# Patient Record
Sex: MALE | Race: WHITE | NOT HISPANIC OR LATINO | Employment: OTHER | URBAN - NONMETROPOLITAN AREA
[De-identification: names, ages, dates, MRNs, and addresses within clinical notes are randomized per-mention and may not be internally consistent; named-entity substitution may affect disease eponyms.]

---

## 2017-06-14 ENCOUNTER — OFFICE VISIT (OUTPATIENT)
Dept: GASTROENTEROLOGY | Facility: CLINIC | Age: 67
End: 2017-06-14

## 2017-06-14 VITALS
BODY MASS INDEX: 31.86 KG/M2 | HEIGHT: 67 IN | SYSTOLIC BLOOD PRESSURE: 138 MMHG | HEART RATE: 69 BPM | DIASTOLIC BLOOD PRESSURE: 80 MMHG | WEIGHT: 203 LBS

## 2017-06-14 DIAGNOSIS — K21.9 GASTROESOPHAGEAL REFLUX DISEASE WITHOUT ESOPHAGITIS: Primary | ICD-10-CM

## 2017-06-14 PROCEDURE — 99212 OFFICE O/P EST SF 10 MIN: CPT | Performed by: INTERNAL MEDICINE

## 2017-06-14 RX ORDER — FINASTERIDE 5 MG/1
TABLET, FILM COATED ORAL
Refills: 2 | COMMUNITY
Start: 2017-06-03 | End: 2018-09-17 | Stop reason: SDUPTHER

## 2017-06-14 RX ORDER — LISINOPRIL 10 MG/1
TABLET ORAL
Refills: 5 | COMMUNITY
Start: 2017-06-07

## 2017-06-14 RX ORDER — FLUOCINONIDE 0.5 MG/G
OINTMENT TOPICAL
Refills: 2 | COMMUNITY
Start: 2017-06-08

## 2017-06-14 RX ORDER — METRONIDAZOLE 7.5 MG/G
GEL TOPICAL
Refills: 2 | COMMUNITY
Start: 2017-06-08

## 2017-06-14 RX ORDER — OMEPRAZOLE 40 MG/1
40 CAPSULE, DELAYED RELEASE ORAL DAILY
Qty: 90 CAPSULE | Refills: 3 | Status: SHIPPED | OUTPATIENT
Start: 2017-06-14 | End: 2018-01-19 | Stop reason: SDUPTHER

## 2017-06-14 RX ORDER — OMEPRAZOLE 40 MG/1
CAPSULE, DELAYED RELEASE ORAL
Refills: 4 | COMMUNITY
Start: 2017-06-03 | End: 2017-06-14 | Stop reason: SDUPTHER

## 2017-06-18 NOTE — PROGRESS NOTES
Chief Complaint   Patient presents with   • 1 Year Clinical Appointment   • Gastroesophageal Reflux Disease Without Esophagitis        Enrique Selby is a  66 y.o. male here for a follow up visit forGERD     HPI:  The patient returns for follow-up.  Currently he is on Prilosec 40 mg daily which is controlling his reflux symptoms.  We discussed at some length potential side effects of PPIs.  Denies dysphagia.  Bowel movements regular with no recent change in bowel habits.  Denies rectal bleeding or melena.  His last colonoscopy was in 2011.    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, LAMONT,LLL      • Disorder of lipoprotein and lipid metabolism    • Epigastric pain    • Gastroesophageal reflux disease    • Gastroesophageal reflux disease    • Gastrointestinal symptom     Recent change in bowel habits      • Hematuria, unspecified    • Hiatal hernia    • Hip pain    • Hypertriglyceridemia    • IBS (irritable bowel syndrome)    • Malignant melanoma    • Multiple joint pain     Labs ordered, consult       • Nausea    • Right lower quadrant pain    • Right upper quadrant pain    • Screening for hyperlipidemia    • Screening for malignant neoplasm of prostate    • Trochanteric bursitis     Inj of Solumedrol with 1% Xylocaine given,tolerate   • UTI (urinary tract infection)        Current Outpatient Prescriptions   Medication Sig Dispense Refill   • finasteride (PROSCAR) 5 MG tablet   2   • fluocinonide (LIDEX) 0.05 % ointment   2   • lisinopril (PRINIVIL,ZESTRIL) 10 MG tablet   5   • metroNIDAZOLE (METROGEL) 0.75 % gel   2   • omeprazole (priLOSEC) 40 MG capsule Take 1 capsule by mouth Daily. 90 capsule 3     No current facility-administered medications for this visit.        PRN Meds:.    No Known Allergies    Social History     Social History   • Marital status:      Spouse name: N/A   • Number of children: N/A   • Years of  education: N/A     Occupational History   • Not on file.     Social History Main Topics   • Smoking status: Never Smoker   • Smokeless tobacco: Former User     Types: Snuff, Chew     Quit date: 2010   • Alcohol use Yes      Comment: Patient States He Comsumes Approximately 3 Alcoholic Beverages Per Month   • Drug use: No   • Sexual activity: Defer      Comment:      Other Topics Concern   • Not on file     Social History Narrative       Family History   Problem Relation Age of Onset   • Diabetes Other    • Hypertension Other        Review of Systems   Constitutional: Negative for activity change, chills, diaphoresis and unexpected weight change.   Cardiovascular: Negative for chest pain.   Gastrointestinal: Negative for abdominal distention, abdominal pain, anal bleeding, blood in stool, constipation, diarrhea, nausea, rectal pain and vomiting.   Musculoskeletal: Negative for arthralgias and myalgias.       Vitals:    06/14/17 1550   BP: 138/80   Pulse: 69       Physical Exam   Constitutional: He appears well-developed and well-nourished.   Cardiovascular: Normal rate, regular rhythm and normal heart sounds.  Exam reveals no gallop and no friction rub.    No murmur heard.  Pulmonary/Chest: Effort normal and breath sounds normal. No respiratory distress. He has no rales.   Abdominal: Soft. Normal appearance and bowel sounds are normal. He exhibits no distension and no ascites. There is no hepatosplenomegaly, splenomegaly or hepatomegaly. There is no tenderness. No hernia.   Nursing note and vitals reviewed.      ASSESSMENT AND PLAN      ICD-10-CM ICD-9-CM   1. Gastroesophageal reflux disease without esophagitis K21.9 530.81      Plan:  Continue current medical regimen  Standard antireflux measures  Return to clinic in 1 year        This document has been electronically signed by Rupesh Kimball MD on June 18, 2017 3:39 PM                                   “EMR Dragon/Transcription disclaimer:   Much of  this encounter note is an electronic transcription/translation of spoken language to printed text. The electronic translation of spoken language may permit erroneous, or at times, nonsensical words or phrases to be inadvertently transcribed; Although I have reviewed the note for such errors, some may still exist.”

## 2017-09-21 ENCOUNTER — LAB (OUTPATIENT)
Dept: LAB | Facility: HOSPITAL | Age: 67
End: 2017-09-21

## 2017-09-21 ENCOUNTER — TRANSCRIBE ORDERS (OUTPATIENT)
Dept: LAB | Facility: HOSPITAL | Age: 67
End: 2017-09-21

## 2017-09-21 DIAGNOSIS — Z79.899 ENCOUNTER FOR LONG-TERM (CURRENT) USE OF MEDICATIONS: ICD-10-CM

## 2017-09-21 DIAGNOSIS — Z79.899 ENCOUNTER FOR LONG-TERM (CURRENT) USE OF MEDICATIONS: Primary | ICD-10-CM

## 2017-09-21 LAB
ALBUMIN SERPL-MCNC: 4.2 G/DL (ref 3.4–4.8)
ALBUMIN/GLOB SERPL: 1.4 G/DL (ref 1.1–1.8)
ALP SERPL-CCNC: 59 U/L (ref 38–126)
ALT SERPL W P-5'-P-CCNC: 56 U/L (ref 21–72)
ANION GAP SERPL CALCULATED.3IONS-SCNC: 12 MMOL/L (ref 5–15)
AST SERPL-CCNC: 33 U/L (ref 17–59)
BASOPHILS # BLD AUTO: 0.03 10*3/MM3 (ref 0–0.2)
BASOPHILS NFR BLD AUTO: 0.4 % (ref 0–2)
BILIRUB SERPL-MCNC: 0.7 MG/DL (ref 0.2–1.3)
BUN BLD-MCNC: 14 MG/DL (ref 7–21)
BUN/CREAT SERPL: 14.7 (ref 7–25)
CALCIUM SPEC-SCNC: 9.1 MG/DL (ref 8.4–10.2)
CHLORIDE SERPL-SCNC: 100 MMOL/L (ref 95–110)
CO2 SERPL-SCNC: 26 MMOL/L (ref 22–31)
CREAT BLD-MCNC: 0.95 MG/DL (ref 0.7–1.3)
DEPRECATED RDW RBC AUTO: 41.1 FL (ref 35.1–43.9)
EOSINOPHIL # BLD AUTO: 0.17 10*3/MM3 (ref 0–0.7)
EOSINOPHIL NFR BLD AUTO: 2 % (ref 0–7)
ERYTHROCYTE [DISTWIDTH] IN BLOOD BY AUTOMATED COUNT: 13.1 % (ref 11.5–14.5)
GFR SERPL CREATININE-BSD FRML MDRD: 80 ML/MIN/1.73 (ref 49–113)
GLOBULIN UR ELPH-MCNC: 2.9 GM/DL (ref 2.3–3.5)
GLUCOSE BLD-MCNC: 121 MG/DL (ref 60–100)
HCT VFR BLD AUTO: 44 % (ref 39–49)
HGB BLD-MCNC: 14.7 G/DL (ref 13.7–17.3)
IMM GRANULOCYTES # BLD: 0.03 10*3/MM3 (ref 0–0.02)
IMM GRANULOCYTES NFR BLD: 0.4 % (ref 0–0.5)
LYMPHOCYTES # BLD AUTO: 2.43 10*3/MM3 (ref 0.6–4.2)
LYMPHOCYTES NFR BLD AUTO: 28.7 % (ref 10–50)
MCH RBC QN AUTO: 28.6 PG (ref 26.5–34)
MCHC RBC AUTO-ENTMCNC: 33.4 G/DL (ref 31.5–36.3)
MCV RBC AUTO: 85.6 FL (ref 80–98)
MONOCYTES # BLD AUTO: 1.15 10*3/MM3 (ref 0–0.9)
MONOCYTES NFR BLD AUTO: 13.6 % (ref 0–12)
NEUTROPHILS # BLD AUTO: 4.67 10*3/MM3 (ref 2–8.6)
NEUTROPHILS NFR BLD AUTO: 54.9 % (ref 37–80)
PLATELET # BLD AUTO: 191 10*3/MM3 (ref 150–450)
PMV BLD AUTO: 11.7 FL (ref 8–12)
POTASSIUM BLD-SCNC: 4.1 MMOL/L (ref 3.5–5.1)
PROT SERPL-MCNC: 7.1 G/DL (ref 6.3–8.6)
RBC # BLD AUTO: 5.14 10*6/MM3 (ref 4.37–5.74)
SODIUM BLD-SCNC: 138 MMOL/L (ref 137–145)
TSH SERPL DL<=0.05 MIU/L-ACNC: 3.12 MIU/ML (ref 0.46–4.68)
WBC NRBC COR # BLD: 8.48 10*3/MM3 (ref 3.2–9.8)

## 2017-09-21 PROCEDURE — 85025 COMPLETE CBC W/AUTO DIFF WBC: CPT | Performed by: DERMATOLOGY

## 2017-09-21 PROCEDURE — 80053 COMPREHEN METABOLIC PANEL: CPT | Performed by: DERMATOLOGY

## 2017-09-21 PROCEDURE — 36415 COLL VENOUS BLD VENIPUNCTURE: CPT

## 2017-09-21 PROCEDURE — 84443 ASSAY THYROID STIM HORMONE: CPT | Performed by: DERMATOLOGY

## 2018-01-19 DIAGNOSIS — K21.9 GASTROESOPHAGEAL REFLUX DISEASE WITHOUT ESOPHAGITIS: ICD-10-CM

## 2018-01-19 RX ORDER — OMEPRAZOLE 40 MG/1
40 CAPSULE, DELAYED RELEASE ORAL DAILY
Qty: 90 CAPSULE | Refills: 1 | Status: SHIPPED | OUTPATIENT
Start: 2018-01-19 | End: 2018-04-20 | Stop reason: SDUPTHER

## 2018-04-20 DIAGNOSIS — K21.9 GASTROESOPHAGEAL REFLUX DISEASE WITHOUT ESOPHAGITIS: ICD-10-CM

## 2018-04-26 RX ORDER — OMEPRAZOLE 40 MG/1
40 CAPSULE, DELAYED RELEASE ORAL DAILY
Qty: 90 CAPSULE | Refills: 1 | Status: SHIPPED | OUTPATIENT
Start: 2018-04-26 | End: 2018-05-23 | Stop reason: SDUPTHER

## 2018-05-23 ENCOUNTER — OFFICE VISIT (OUTPATIENT)
Dept: GASTROENTEROLOGY | Facility: CLINIC | Age: 68
End: 2018-05-23

## 2018-05-23 VITALS
DIASTOLIC BLOOD PRESSURE: 74 MMHG | HEIGHT: 67 IN | SYSTOLIC BLOOD PRESSURE: 150 MMHG | BODY MASS INDEX: 32.8 KG/M2 | WEIGHT: 209 LBS

## 2018-05-23 DIAGNOSIS — K21.9 GASTROESOPHAGEAL REFLUX DISEASE WITHOUT ESOPHAGITIS: Primary | ICD-10-CM

## 2018-05-23 PROCEDURE — 99213 OFFICE O/P EST LOW 20 MIN: CPT | Performed by: NURSE PRACTITIONER

## 2018-05-23 RX ORDER — OMEPRAZOLE 40 MG/1
40 CAPSULE, DELAYED RELEASE ORAL DAILY
Qty: 90 CAPSULE | Refills: 3 | Status: SHIPPED | OUTPATIENT
Start: 2018-05-23 | End: 2019-05-22 | Stop reason: SDUPTHER

## 2018-05-23 NOTE — PROGRESS NOTES
Chief Complaint   Patient presents with   • Heartburn       Subjective    Enriqueramsey Selby is a 67 y.o. male. he is here today for follow-up.  67-year-old male presents for 1 year follow-up regarding chronic GERD.  Denies any abdominal pain.  He was diagnosed by Dr. Kimball several years ago and has been well maintained on Prilosec daily.  He denies any breakthrough symptoms.  Denies any nausea vomiting or changes in his bowel habits.  Colonoscopy was last completed in 2011 and repeat is due in 2021.  EGD was last completed to/3/11 which noted normal esophagus, 2 cm sliding hiatal hernia, nonerosive gastritis normal duodenum.    Heartburn   He complains of heartburn. He reports no abdominal pain, no belching, no chest pain, no choking, no coughing, no dysphagia, no early satiety, no globus sensation, no hoarse voice, no nausea, no sore throat, no stridor, no tooth decay, no water brash or no wheezing. This is a chronic problem. The current episode started more than 1 year ago. The problem occurs rarely. The problem has been resolved. Exacerbated by: symptoms are well controlled by medication  Pertinent negatives include no fatigue. He has tried a PPI for the symptoms. The treatment provided significant relief.     Plan; follow-up in 1 year refills of Prilosec are sent to pharmacy return to GI office sooner if needed.  Continue standard antireflux measures.  Repeat colonoscopy in 2021 for surveillance.     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, LAMONT,LLL      • Disorder of lipoprotein and lipid metabolism    • Epigastric pain    • Gastroesophageal reflux disease    • Gastroesophageal reflux disease    • Gastrointestinal symptom     Recent change in bowel habits      • Hematuria, unspecified    • Hiatal hernia    • Hip pain    •  Hypertriglyceridemia    • IBS (irritable bowel syndrome)    • Malignant melanoma    • Multiple joint pain     Labs ordered, consult       • Nausea    • Right lower quadrant pain    • Right upper quadrant pain    • Screening for hyperlipidemia    • Screening for malignant neoplasm of prostate    • Trochanteric bursitis     Inj of Solumedrol with 1% Xylocaine given,tolerate   • UTI (urinary tract infection)      Past Surgical History:   Procedure Laterality Date   • COLONOSCOPY  02/01/2011   • ENDOSCOPY      w/biopsy. Normal esophagus, symmetrical & patent pylorus, & duodenum. Hiatus hernia was found in GE junction. Non-erosive gastritis found in antrum. Biopsy taken.   • PRE-MALIGNANT / BENIGN SKIN LESION EXCISION     • SKIN BIOPSY     • UPPER GASTROINTESTINAL ENDOSCOPY  02/03/2011     Family History   Problem Relation Age of Onset   • Diabetes Other    • Hypertension Other        Current Outpatient Prescriptions   Medication Sig Dispense Refill   • finasteride (PROSCAR) 5 MG tablet   2   • fluocinonide (LIDEX) 0.05 % ointment   2   • lisinopril (PRINIVIL,ZESTRIL) 10 MG tablet   5   • metroNIDAZOLE (METROGEL) 0.75 % gel   2   • omeprazole (priLOSEC) 40 MG capsule Take 1 capsule by mouth Daily. 90 capsule 3     No current facility-administered medications for this visit.      No Known Allergies  Social History     Social History   • Marital status:      Social History Main Topics   • Smoking status: Never Smoker   • Smokeless tobacco: Former User     Types: Snuff, Chew     Quit date: 2010   • Alcohol use Yes      Comment: Patient States He Comsumes Approximately 3 Alcoholic Beverages Per Month   • Drug use: No   • Sexual activity: Defer      Comment:      Other Topics Concern   • Not on file       Review of Systems  Review of Systems   Constitutional: Negative for activity change, appetite change, chills, diaphoresis, fatigue, fever and unexpected weight change.   HENT: Negative for hoarse  "voice, sore throat and trouble swallowing.    Respiratory: Negative for cough, choking, shortness of breath and wheezing.    Cardiovascular: Negative for chest pain.   Gastrointestinal: Positive for heartburn. Negative for abdominal distention, abdominal pain, anal bleeding, blood in stool, constipation, diarrhea, dysphagia, nausea, rectal pain and vomiting.   Musculoskeletal: Negative for arthralgias.   Skin: Negative for pallor.   Neurological: Negative for light-headedness.        /74   Ht 170.2 cm (67\")   Wt 94.8 kg (209 lb)   BMI 32.73 kg/m²     Objective    Physical Exam   Constitutional: He is oriented to person, place, and time. He appears well-developed and well-nourished. He is cooperative. No distress.   HENT:   Head: Normocephalic and atraumatic.   Neck: Normal range of motion. Neck supple. No thyromegaly present.   Cardiovascular: Normal rate, regular rhythm and normal heart sounds.    Pulmonary/Chest: Effort normal and breath sounds normal. He has no wheezes. He has no rhonchi. He has no rales.   Abdominal: Soft. Normal appearance and bowel sounds are normal. He exhibits no distension. There is no hepatosplenomegaly. There is no tenderness. There is no rigidity and no guarding. No hernia.   Lymphadenopathy:     He has no cervical adenopathy.   Neurological: He is alert and oriented to person, place, and time.   Skin: Skin is warm, dry and intact. No rash noted. No pallor.   Psychiatric: He has a normal mood and affect. His speech is normal.     Lab on 09/21/2017   Component Date Value Ref Range Status   • Glucose 09/21/2017 121* 60 - 100 mg/dL Final   • BUN 09/21/2017 14  7 - 21 mg/dL Final   • Creatinine 09/21/2017 0.95  0.70 - 1.30 mg/dL Final   • Sodium 09/21/2017 138  137 - 145 mmol/L Final   • Potassium 09/21/2017 4.1  3.5 - 5.1 mmol/L Final   • Chloride 09/21/2017 100  95 - 110 mmol/L Final   • CO2 09/21/2017 26.0  22.0 - 31.0 mmol/L Final   • Calcium 09/21/2017 9.1  8.4 - 10.2 mg/dL " Final   • Total Protein 09/21/2017 7.1  6.3 - 8.6 g/dL Final   • Albumin 09/21/2017 4.20  3.40 - 4.80 g/dL Final   • ALT (SGPT) 09/21/2017 56  21 - 72 U/L Final   • AST (SGOT) 09/21/2017 33  17 - 59 U/L Final   • Alkaline Phosphatase 09/21/2017 59  38 - 126 U/L Final   • Total Bilirubin 09/21/2017 0.7  0.2 - 1.3 mg/dL Final   • eGFR Non African Amer 09/21/2017 80  49 - 113 mL/min/1.73 Final   • Globulin 09/21/2017 2.9  2.3 - 3.5 gm/dL Final   • A/G Ratio 09/21/2017 1.4  1.1 - 1.8 g/dL Final   • BUN/Creatinine Ratio 09/21/2017 14.7  7.0 - 25.0 Final   • Anion Gap 09/21/2017 12.0  5.0 - 15.0 mmol/L Final   • TSH 09/21/2017 3.120  0.460 - 4.680 mIU/mL Final   • WBC 09/21/2017 8.48  3.20 - 9.80 10*3/mm3 Final   • RBC 09/21/2017 5.14  4.37 - 5.74 10*6/mm3 Final   • Hemoglobin 09/21/2017 14.7  13.7 - 17.3 g/dL Final   • Hematocrit 09/21/2017 44.0  39.0 - 49.0 % Final   • MCV 09/21/2017 85.6  80.0 - 98.0 fL Final   • MCH 09/21/2017 28.6  26.5 - 34.0 pg Final   • MCHC 09/21/2017 33.4  31.5 - 36.3 g/dL Final   • RDW 09/21/2017 13.1  11.5 - 14.5 % Final   • RDW-SD 09/21/2017 41.1  35.1 - 43.9 fl Final   • MPV 09/21/2017 11.7  8.0 - 12.0 fL Final   • Platelets 09/21/2017 191  150 - 450 10*3/mm3 Final   • Neutrophil % 09/21/2017 54.9  37.0 - 80.0 % Final   • Lymphocyte % 09/21/2017 28.7  10.0 - 50.0 % Final   • Monocyte % 09/21/2017 13.6* 0.0 - 12.0 % Final   • Eosinophil % 09/21/2017 2.0  0.0 - 7.0 % Final   • Basophil % 09/21/2017 0.4  0.0 - 2.0 % Final   • Immature Grans % 09/21/2017 0.4  0.0 - 0.5 % Final   • Neutrophils, Absolute 09/21/2017 4.67  2.00 - 8.60 10*3/mm3 Final   • Lymphocytes, Absolute 09/21/2017 2.43  0.60 - 4.20 10*3/mm3 Final   • Monocytes, Absolute 09/21/2017 1.15* 0.00 - 0.90 10*3/mm3 Final   • Eosinophils, Absolute 09/21/2017 0.17  0.00 - 0.70 10*3/mm3 Final   • Basophils, Absolute 09/21/2017 0.03  0.00 - 0.20 10*3/mm3 Final   • Immature Grans, Absolute 09/21/2017 0.03* 0.00 - 0.02 10*3/mm3 Final      Assessment/Plan      1. Gastroesophageal reflux disease without esophagitis    .       Orders placed during this encounter include:  No orders of the defined types were placed in this encounter.      * Surgery not found *    Review and/or summary of lab tests, radiology, procedures, medications. Review and summary of old records and obtaining of history. The risks and benefits of my recommendations, as well as other treatment options were discussed with the patient today. Questions were answered.    New Medications Ordered This Visit   Medications   • omeprazole (priLOSEC) 40 MG capsule     Sig: Take 1 capsule by mouth Daily.     Dispense:  90 capsule     Refill:  3       Follow-up: Return in about 1 year (around 5/23/2019).          This document has been electronically signed by GIOVANNA Johnston on May 23, 2018 4:06 PM             Results for orders placed or performed in visit on 09/21/17   CBC Auto Differential   Result Value Ref Range    WBC 8.48 3.20 - 9.80 10*3/mm3    RBC 5.14 4.37 - 5.74 10*6/mm3    Hemoglobin 14.7 13.7 - 17.3 g/dL    Hematocrit 44.0 39.0 - 49.0 %    MCV 85.6 80.0 - 98.0 fL    MCH 28.6 26.5 - 34.0 pg    MCHC 33.4 31.5 - 36.3 g/dL    RDW 13.1 11.5 - 14.5 %    RDW-SD 41.1 35.1 - 43.9 fl    MPV 11.7 8.0 - 12.0 fL    Platelets 191 150 - 450 10*3/mm3    Neutrophil % 54.9 37.0 - 80.0 %    Lymphocyte % 28.7 10.0 - 50.0 %    Monocyte % 13.6 (H) 0.0 - 12.0 %    Eosinophil % 2.0 0.0 - 7.0 %    Basophil % 0.4 0.0 - 2.0 %    Immature Grans % 0.4 0.0 - 0.5 %    Neutrophils, Absolute 4.67 2.00 - 8.60 10*3/mm3    Lymphocytes, Absolute 2.43 0.60 - 4.20 10*3/mm3    Monocytes, Absolute 1.15 (H) 0.00 - 0.90 10*3/mm3    Eosinophils, Absolute 0.17 0.00 - 0.70 10*3/mm3    Basophils, Absolute 0.03 0.00 - 0.20 10*3/mm3    Immature Grans, Absolute 0.03 (H) 0.00 - 0.02 10*3/mm3   TSH   Result Value Ref Range    TSH 3.120 0.460 - 4.680 mIU/mL   Comprehensive Metabolic Panel   Result Value Ref Range    Glucose  121 (H) 60 - 100 mg/dL    BUN 14 7 - 21 mg/dL    Creatinine 0.95 0.70 - 1.30 mg/dL    Sodium 138 137 - 145 mmol/L    Potassium 4.1 3.5 - 5.1 mmol/L    Chloride 100 95 - 110 mmol/L    CO2 26.0 22.0 - 31.0 mmol/L    Calcium 9.1 8.4 - 10.2 mg/dL    Total Protein 7.1 6.3 - 8.6 g/dL    Albumin 4.20 3.40 - 4.80 g/dL    ALT (SGPT) 56 21 - 72 U/L    AST (SGOT) 33 17 - 59 U/L    Alkaline Phosphatase 59 38 - 126 U/L    Total Bilirubin 0.7 0.2 - 1.3 mg/dL    eGFR Non  Amer 80 49 - 113 mL/min/1.73    Globulin 2.9 2.3 - 3.5 gm/dL    A/G Ratio 1.4 1.1 - 1.8 g/dL    BUN/Creatinine Ratio 14.7 7.0 - 25.0    Anion Gap 12.0 5.0 - 15.0 mmol/L   Results for orders placed or performed during the hospital encounter of 06/09/16   CBC and Differential   Result Value Ref Range    WBC 6.5 3.2 - 9.8 x1000/uL    RBC 4.87 4.37 - 5.74 gaurav/mm3    Hemoglobin 14.3 13.7 - 17.3 gm/dl    Hematocrit 41.5 39.0 - 49.0 %    MCV 85.2 80.0 - 98.0 fl    MCH 29.4 26.0 - 34.0 pg    MCHC 34.5 31.5 - 36.3 gm/dl    RDW 13.6 11.5 - 14.5 %    Platelets 191 150 - 450 x1000/mm3    MPV 11.7 8.0 - 12.0 fl    Neutrophil Rel % 51.1 37.0 - 80.0 %    Lymphocyte Rel % 31.7 10.0 - 50.0 %    Monocyte Rel % 14.2 (H) 0.0 - 12.0 %    Eosinophil Rel % 2.0 0.0 - 7.0 %    Basophil Rel % 0.5 0.0 - 2.0 %    Immature Granulocyte Rel % 0.50 0.00 - 0.50 %    Neutrophils Absolute 3.33 2.00 - 8.60 x1000/uL    Lymphocytes Absolute 2.06 0.60 - 4.20 x1000/uL    Monocytes Absolute 0.92 (H) 0.00 - 0.90 x1000/uL    Eosinophils Absolute 0.13 0.00 - 0.70 x1000/uL    Basophils Absolute 0.03 0.00 - 0.20 x1000/uL    Immature Granulocytes Absolute 0.030 (H) 0.005 - 0.022 x1000/uL   Comprehensive metabolic panel   Result Value Ref Range    Sodium 139 137 - 145 mmol/L    Potassium 4.2 3.5 - 5.1 mmol/L    Chloride 101 95 - 110 mmol/L    CO2 25 22 - 31 mmol/L    Anion Gap 13.0 5.0 - 15.0 mmol/L    Glucose 102 (H) 60 - 100 mg/dl    BUN 12 7 - 21 mg/dl    Creatinine 0.8 0.7 - 1.3 mg/dl    GFR MDRD  Non  98 49 - 113 mL/min/1.73 sq.M    GFR MDRD  118 (H) 49 - 113 mL/min/1.73 sq.M    Calcium 9.2 8.4 - 10.2 mg/dl    Total Protein 6.8 6.3 - 8.6 gm/dl    Albumin 4.1 3.4 - 4.8 gm/dl    Total Bilirubin 0.8 0.2 - 1.3 mg/dl    Alkaline Phosphatase 54 38 - 126 U/L    AST (SGOT) 34 17 - 59 U/L    ALT (SGPT) 42 21 - 72 U/L   Results for orders placed or performed during the hospital encounter of 12/22/15   Nuclear antigen antibody, IFA   Result Value Ref Range    WINNIE Negative    CBC and Differential   Result Value Ref Range    WBC 8.1 3.2 - 9.8 x1000/uL    RBC 5.15 4.37 - 5.74 gaurav/mm3    Hemoglobin 14.5 13.7 - 17.3 gm/dl    Hematocrit 42.5 39.0 - 49.0 %    MCV 82.5 80.0 - 98.0 fl    MCH 28.2 26.0 - 34.0 pg    MCHC 34.1 31.5 - 36.3 gm/dl    RDW 13.2 11.5 - 14.5 %    Platelets 213 150 - 450 x1000/mm3    MPV 10.7 8.0 - 12.0 fl    Neutrophil Rel % 55.3 37.0 - 80.0 %    Lymphocyte Rel % 28.4 10.0 - 50.0 %    Monocyte Rel % 14.0 (H) 0.0 - 12.0 %    Eosinophil Rel % 1.7 0.0 - 7.0 %    Basophil Rel % 0.2 0.0 - 2.0 %    Immature Granulocyte Rel % 0.40 0.00 - 0.50 %    Neutrophils Absolute 4.46 2.00 - 8.60 x1000/uL    Lymphocytes Absolute 2.29 0.60 - 4.20 x1000/uL    Monocytes Absolute 1.13 (H) 0.00 - 0.90 x1000/uL    Eosinophils Absolute 0.14 0.00 - 0.70 x1000/uL    Basophils Absolute 0.02 0.00 - 0.20 x1000/uL    Immature Granulocytes Absolute 0.030 (H) 0.005 - 0.022 x1000/uL     *Note: Due to a large number of results and/or encounters for the requested time period, some results have not been displayed. A complete set of results can be found in Results Review.

## 2018-05-23 NOTE — PATIENT INSTRUCTIONS
Food Choices for Gastroesophageal Reflux Disease, Adult  When you have gastroesophageal reflux disease (GERD), the foods you eat and your eating habits are very important. Choosing the right foods can help ease your discomfort.  What guidelines do I need to follow?  · Choose fruits, vegetables, whole grains, and low-fat dairy products.  · Choose low-fat meat, fish, and poultry.  · Limit fats such as oils, salad dressings, butter, nuts, and avocado.  · Keep a food diary. This helps you identify foods that cause symptoms.  · Avoid foods that cause symptoms. These may be different for everyone.  · Eat small meals often instead of 3 large meals a day.  · Eat your meals slowly, in a place where you are relaxed.  · Limit fried foods.  · Cook foods using methods other than frying.  · Avoid drinking alcohol.  · Avoid drinking large amounts of liquids with your meals.  · Avoid bending over or lying down until 2-3 hours after eating.  What foods are not recommended?  These are some foods and drinks that may make your symptoms worse:  Vegetables   Tomatoes. Tomato juice. Tomato and spaghetti sauce. Chili peppers. Onion and garlic. Horseradish.  Fruits   Oranges, grapefruit, and lemon (fruit and juice).  Meats   High-fat meats, fish, and poultry. This includes hot dogs, ribs, ham, sausage, salami, and anthony.  Dairy   Whole milk and chocolate milk. Sour cream. Cream. Butter. Ice cream. Cream cheese.  Drinks   Coffee and tea. Bubbly (carbonated) drinks or energy drinks.  Condiments   Hot sauce. Barbecue sauce.  Sweets/Desserts   Chocolate and cocoa. Donuts. Peppermint and spearmint.  Fats and Oils   High-fat foods. This includes French fries and potato chips.  Other   Vinegar. Strong spices. This includes black pepper, white pepper, red pepper, cayenne, vargas powder, cloves, ginger, and chili powder.  The items listed above may not be a complete list of foods and drinks to avoid. Contact your dietitian for more information.    This information is not intended to replace advice given to you by your health care provider. Make sure you discuss any questions you have with your health care provider.  Document Released: 06/18/2013 Document Revised: 05/25/2017 Document Reviewed: 10/22/2014  [employer]vier Interactive Patient Education © 2017 [employer]

## 2018-09-17 ENCOUNTER — CONSULT (OUTPATIENT)
Dept: SURGERY | Facility: CLINIC | Age: 68
End: 2018-09-17

## 2018-09-17 VITALS
HEART RATE: 86 BPM | WEIGHT: 205 LBS | TEMPERATURE: 98 F | DIASTOLIC BLOOD PRESSURE: 66 MMHG | BODY MASS INDEX: 32.18 KG/M2 | HEIGHT: 67 IN | SYSTOLIC BLOOD PRESSURE: 130 MMHG

## 2018-09-17 DIAGNOSIS — C44.90 SKIN CANCER: Primary | ICD-10-CM

## 2018-09-17 PROBLEM — R29.898 WEAKNESS OF LEFT LOWER EXTREMITY: Status: ACTIVE | Noted: 2018-07-19

## 2018-09-17 PROBLEM — M25.552 BILATERAL HIP PAIN: Status: ACTIVE | Noted: 2018-07-19

## 2018-09-17 PROBLEM — Z85.820 HISTORY OF MELANOMA: Status: ACTIVE | Noted: 2018-07-19

## 2018-09-17 PROBLEM — M25.551 BILATERAL HIP PAIN: Status: ACTIVE | Noted: 2018-07-19

## 2018-09-17 PROCEDURE — 99203 OFFICE O/P NEW LOW 30 MIN: CPT | Performed by: SURGERY

## 2018-09-17 RX ORDER — HYDROCODONE BITARTRATE AND ACETAMINOPHEN 7.5; 325 MG/1; MG/1
TABLET ORAL
Refills: 0 | COMMUNITY
Start: 2018-08-06

## 2018-09-17 RX ORDER — FINASTERIDE 5 MG/1
1.25 TABLET, FILM COATED ORAL
COMMUNITY

## 2018-09-17 NOTE — PATIENT INSTRUCTIONS

## 2018-09-17 NOTE — PROGRESS NOTES
Subjective   Enrique Selby is a 68 y.o. male     History of Present Illness referred by Dr. 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  This was done a few weeks ago.  Patient does not have sleep clear where the biopsy was done.  He's had skin cancers in the past.  He is also set up to have orthopedic surgery on his hip next week and instructed to get this done before the orthopedic surgery.        Review of Systems   Constitutional: Negative.    HENT: Positive for hearing loss.    Eyes: Negative.    Respiratory: Negative.    Cardiovascular: Negative.    Gastrointestinal: Negative.    Endocrine: Negative.    Genitourinary: Negative.    Musculoskeletal: Positive for arthralgias.        Bilateral Leg Pain   Skin:        Squamous Cell Upper Chest Wall   Allergic/Immunologic: Negative.    Neurological: Negative.    Hematological: Negative.    Psychiatric/Behavioral: Negative.      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, LAMONT,LLL      • Disorder of lipoprotein and lipid metabolism    • Epigastric pain    • Gastroesophageal reflux disease    • Gastroesophageal reflux disease    • Gastrointestinal symptom     Recent change in bowel habits      • Hematuria, unspecified    • Hiatal hernia    • Hip pain    • Hypertriglyceridemia    • IBS (irritable bowel syndrome)    • Malignant melanoma (CMS/HCC)    • Multiple joint pain     Labs ordered, consult       • Nausea    • Right lower quadrant pain    • Right upper quadrant pain    • Screening for hyperlipidemia    • Screening for malignant neoplasm of prostate    • Trochanteric bursitis     Inj of Solumedrol with 1% Xylocaine given,tolerate   • UTI (urinary tract infection)      Past Surgical History:   Procedure Laterality Date   • COLONOSCOPY  02/01/2011   • ENDOSCOPY      w/biopsy. Normal esophagus,  symmetrical & patent pylorus, & duodenum. Hiatus hernia was found in GE junction. Non-erosive gastritis found in antrum. Biopsy taken.   • PRE-MALIGNANT / BENIGN SKIN LESION EXCISION     • SKIN BIOPSY     • UPPER GASTROINTESTINAL ENDOSCOPY  02/03/2011     Family History   Problem Relation Age of Onset   • Diabetes Other    • Hypertension Other      Social History     Social History   • Marital status:      Spouse name: N/A   • Number of children: N/A   • Years of education: N/A     Occupational History   • Not on file.     Social History Main Topics   • Smoking status: Never Smoker   • Smokeless tobacco: Former User     Types: Snuff, Chew     Quit date: 2010   • Alcohol use Yes      Comment: Patient States He Comsumes Approximately 3 Alcoholic Beverages Per Month   • Drug use: No   • Sexual activity: Defer      Comment:      Other Topics Concern   • Not on file     Social History Narrative   • No narrative on file     No Known Allergies    Home Medications:  Prior to Admission medications    Medication Sig Start Date End Date Taking? Authorizing Provider   fluocinonide (LIDEX) 0.05 % ointment  6/8/17  Yes Les Grimes MD   lisinopril (PRINIVIL,ZESTRIL) 10 MG tablet  6/7/17  Yes Les Grimes MD   omeprazole (priLOSEC) 40 MG capsule Take 1 capsule by mouth Daily. 5/23/18  Yes Nicolasa Hoyos APRN   finasteride (PROSCAR) 5 MG tablet  6/3/17 9/17/18 Yes Les Grimes MD   finasteride (PROSCAR) 5 MG tablet Take 1.25 mg by mouth.    Les Grimes MD   HYDROcodone-acetaminophen (NORCO) 7.5-325 MG per tablet  8/6/18   Les Grimes MD   metroNIDAZOLE (METROGEL) 0.75 % gel  6/8/17   Les Grimes MD       Objective   Physical Exam   Constitutional: He is oriented to person, place, and time. He appears well-developed and well-nourished. No distress.   HENT:   Head: Normocephalic and atraumatic.   Nose: Nose normal.   Eyes: Conjunctivae are normal.   Neck: Normal  range of motion. No tracheal deviation present. No thyromegaly present.   Cardiovascular: Normal rate, regular rhythm and normal heart sounds.    No murmur heard.  Pulmonary/Chest: Effort normal and breath sounds normal. No respiratory distress. He has no wheezes. He has no rales. He exhibits no tenderness.   Abdominal: Soft. He exhibits no distension. There is no tenderness. There is no rebound and no guarding. No hernia.   Musculoskeletal: He exhibits no tenderness or deformity.   Neurological: He is alert and oriented to person, place, and time.   Skin: Skin is warm and dry. No rash noted.   Psychiatric: He has a normal mood and affect. His behavior is normal. Judgment and thought content normal.   Vitals reviewed.   multiple scars consistent with biopsy on his upper anterior chest wall and base of his neck.  No obvious adenopathy appreciated in his neck or his axilla.  No ulcerations or raised lesions.      Assessment/Plan squamous cell skin cancer upper anterior chest wall.  Unable to identify the shave biopsy site so patient will have to go by dermatology the day before we excise this have been markedly clearly and then he should maintain is markedly sees us.  He is undergoing have his orthopedic surgery next week and then we can see him in approximately 1-2 weeks after his orthopedic surgery when he is recovering to address this.  He understands he needs to go by dermatology the day before the morning before he sees us to have this marked      There were no encounter diagnoses.                     This document has been electronically signed by Adelia Bailey CSA on September 17, 2018 2:15 PM

## 2018-10-16 ENCOUNTER — PROCEDURE VISIT (OUTPATIENT)
Dept: SURGERY | Facility: CLINIC | Age: 68
End: 2018-10-16

## 2018-10-16 VITALS
SYSTOLIC BLOOD PRESSURE: 136 MMHG | TEMPERATURE: 98.2 F | WEIGHT: 202 LBS | HEART RATE: 80 BPM | BODY MASS INDEX: 31.71 KG/M2 | HEIGHT: 67 IN | DIASTOLIC BLOOD PRESSURE: 70 MMHG

## 2018-10-16 DIAGNOSIS — C44.90 SKIN CANCER: Primary | ICD-10-CM

## 2018-10-16 DIAGNOSIS — C76.1 PRIMARY SQUAMOUS CELL CARCINOMA OF CHEST WALL (HCC): ICD-10-CM

## 2018-10-16 PROCEDURE — 11604 EXC TR-EXT MAL+MARG 3.1-4 CM: CPT | Performed by: SURGERY

## 2018-10-16 PROCEDURE — 88305 TISSUE EXAM BY PATHOLOGIST: CPT | Performed by: SURGERY

## 2018-10-16 PROCEDURE — 88305 TISSUE EXAM BY PATHOLOGIST: CPT | Performed by: PATHOLOGY

## 2018-10-16 RX ORDER — ASPIRIN 81 MG/1
81 TABLET, CHEWABLE ORAL
COMMUNITY
Start: 2018-09-25 | End: 2019-05-22

## 2018-10-16 RX ORDER — GABAPENTIN 400 MG/1
400 CAPSULE ORAL
COMMUNITY
Start: 2018-09-25

## 2018-10-16 NOTE — PATIENT INSTRUCTIONS

## 2018-10-16 NOTE — PROGRESS NOTES
See prior note.  This patient presents today to have a shave biopsy site of a squamous cell cancer on his anterior chest wall reexcised here in the office under local.  Patient had his hip surgery with orthopedics and had a good result.  He went by dermatology and had the site specifically marked and is currently marked here today.  Again I went over with the patient however do this under local he clearly understands and agrees and wishes to proceed we prepped the area with Betadine infiltrated local and good block was obtained made a skin incision sharply with scalpel full-thickness of the skin excised an ellipse of skin oriented in transverse direction.  Full-thickness incision was made with the scalpel and then we came across subcutaneous with the scalpel.  Specimen was handed off and we clearly labeled recent pathology.  One small vein bleeding was controlled with a figure-of-eight 4-0 Monocryl stitch.  Skin was then closed interrupted 4-0 Monocryl subject her stitches and a running 4-0 Monocryl subject her stitch.  Steri-Strips were applied patient was instructed in local wound care and will follow with us in 2 weeks or sooner if he has any other concerns or questions.  The incision was 3.5 cm in length at its completion

## 2018-10-18 LAB
LAB AP CASE REPORT: NORMAL
PATH REPORT.FINAL DX SPEC: NORMAL
PATH REPORT.GROSS SPEC: NORMAL

## 2018-10-30 ENCOUNTER — OFFICE VISIT (OUTPATIENT)
Dept: SURGERY | Facility: CLINIC | Age: 68
End: 2018-10-30

## 2018-10-30 VITALS
HEART RATE: 82 BPM | SYSTOLIC BLOOD PRESSURE: 120 MMHG | HEIGHT: 67 IN | WEIGHT: 201 LBS | BODY MASS INDEX: 31.55 KG/M2 | TEMPERATURE: 97.9 F | DIASTOLIC BLOOD PRESSURE: 62 MMHG

## 2018-10-30 DIAGNOSIS — C44.90 SKIN CANCER: Primary | ICD-10-CM

## 2018-10-30 PROCEDURE — 99212 OFFICE O/P EST SF 10 MIN: CPT | Performed by: SURGERY

## 2018-10-30 RX ORDER — ONDANSETRON 8 MG/1
TABLET, ORALLY DISINTEGRATING ORAL
COMMUNITY
Start: 2018-10-18 | End: 2019-05-22

## 2018-10-30 NOTE — PATIENT INSTRUCTIONS

## 2018-10-30 NOTE — PROGRESS NOTES
68-year-old male now 2 weeks out from reexcision of the shave biopsy site of squamous cell cancer from his anterior chest.  Pathology was no residual tumor present.  I went over the pathology with him.  His wound is clean and healing appropriately.  He's had a follow-up with dermatology for further skin checks.  He will follow up with us on a when necessary basis  Final Diagnosis   SKIN, RIGHT CHEST WALL:  HEALING BIOPSY SITE.  NEGATIVE FOR RESIDUAL SQUAMOUS CELLS CARCINOMA.  ACTINIC KERATOSIS.   Electronically signed by Wolfgang Taylor MD on 10/18/2018 at 0856

## 2019-05-22 ENCOUNTER — OFFICE VISIT (OUTPATIENT)
Dept: GASTROENTEROLOGY | Facility: CLINIC | Age: 69
End: 2019-05-22

## 2019-05-22 VITALS
HEIGHT: 67 IN | SYSTOLIC BLOOD PRESSURE: 138 MMHG | HEART RATE: 84 BPM | WEIGHT: 203.6 LBS | BODY MASS INDEX: 31.96 KG/M2 | DIASTOLIC BLOOD PRESSURE: 72 MMHG

## 2019-05-22 DIAGNOSIS — K21.9 GASTROESOPHAGEAL REFLUX DISEASE WITHOUT ESOPHAGITIS: Primary | ICD-10-CM

## 2019-05-22 PROCEDURE — 99213 OFFICE O/P EST LOW 20 MIN: CPT | Performed by: NURSE PRACTITIONER

## 2019-05-22 RX ORDER — PREDNISONE 1 MG/1
TABLET ORAL
COMMUNITY
Start: 2019-03-06

## 2019-05-22 RX ORDER — NABUMETONE 750 MG/1
TABLET, FILM COATED ORAL
Refills: 5 | COMMUNITY
Start: 2019-04-05

## 2019-05-22 RX ORDER — OMEPRAZOLE 40 MG/1
40 CAPSULE, DELAYED RELEASE ORAL DAILY
Qty: 90 CAPSULE | Refills: 3 | Status: SHIPPED | OUTPATIENT
Start: 2019-05-22 | End: 2020-02-11 | Stop reason: SDUPTHER

## 2019-05-22 NOTE — PATIENT INSTRUCTIONS
Heartburn  Heartburn is a type of pain or discomfort that can happen in the throat or chest. It is often described as a burning pain. It may also cause a bad taste in the mouth. Heartburn may feel worse when you lie down or bend over, and it is often worse at night. Heartburn may be caused by stomach contents that move back up into the esophagus (reflux).  Follow these instructions at home:  Take these actions to decrease your discomfort and to help avoid complications.  Diet  · Follow a diet as recommended by your health care provider. This may involve avoiding foods and drinks such as:  ? Coffee and tea (with or without caffeine).  ? Drinks that contain alcohol.  ? Energy drinks and sports drinks.  ? Carbonated drinks or sodas.  ? Chocolate and cocoa.  ? Peppermint and mint flavorings.  ? Garlic and onions.  ? Horseradish.  ? Spicy and acidic foods, including peppers, chili powder, vargas powder, vinegar, hot sauces, and barbecue sauce.  ? Citrus fruit juices and citrus fruits, such as oranges, clarissa, and limes.  ? Tomato-based foods, such as red sauce, chili, salsa, and pizza with red sauce.  ? Fried and fatty foods, such as donuts, french fries, potato chips, and high-fat dressings.  ? High-fat meats, such as hot dogs and fatty cuts of red and white meats, such as rib eye steak, sausage, ham, and anthony.  ? High-fat dairy items, such as whole milk, butter, and cream cheese.  · Eat small, frequent meals instead of large meals.  · Avoid drinking large amounts of liquid with your meals.  · Avoid eating meals during the 2-3 hours before bedtime.  · Avoid lying down right after you eat.  · Do not exercise right after you eat.  General instructions  · Pay attention to any changes in your symptoms.  · Take over-the-counter and prescription medicines only as told by your health care provider. Do not take aspirin, ibuprofen, or other NSAIDs unless your health care provider told you to do so.  · Do not use any tobacco  products, including cigarettes, chewing tobacco, and e-cigarettes. If you need help quitting, ask your health care provider.  · Wear loose-fitting clothing. Do not wear anything tight around your waist that causes pressure on your abdomen.  · Raise (elevate) the head of your bed about 6 inches (15 cm).  · Try to reduce your stress, such as with yoga or meditation. If you need help reducing stress, ask your health care provider.  · If you are overweight, reduce your weight to an amount that is healthy for you. Ask your health care provider for guidance about a safe weight loss goal.  · Keep all follow-up visits as told by your health care provider. This is important.  Contact a health care provider if:  · You have new symptoms.  · You have unexplained weight loss.  · You have difficulty swallowing, or it hurts to swallow.  · You have wheezing or a persistent cough.  · Your symptoms do not improve with treatment.  · You have frequent heartburn for more than two weeks.  Get help right away if:  · You have pain in your arms, neck, jaw, teeth, or back.  · You feel sweaty, dizzy, or light-headed.  · You have chest pain or shortness of breath.  · You vomit and your vomit looks like blood or coffee grounds.  · Your stool is bloody or black.  This information is not intended to replace advice given to you by your health care provider. Make sure you discuss any questions you have with your health care provider.  Document Released: 05/06/2010 Document Revised: 05/25/2017 Document Reviewed: 04/13/2016  [employer] Patient Education © 2019 [employer]

## 2019-05-22 NOTE — PROGRESS NOTES
Chief Complaint   Patient presents with   • Heartburn       Subjective    Enrique Selby is a 68 y.o. male. he is here today for follow-up.    History of Present Illness  68-year-old male presents for follow-up regarding heartburn and reflux.  Denies any abdominal pain, nausea, vomiting or changes with his bowel habits.  He was diagnosed by Dr. Kimball in 2011 and states he has been well maintained on Prilosec daily since that time.  He denies any breakthrough symptoms.  His colonoscopy was also completed in 2011 and repeat is due in 2021.  His EGD noted nonerosive gastritis normal duodenum normal esophagus and a 2 cm sliding hiatal hernia.  Plan; discussed with patient if symptoms are well controlled he may have primary care provider continue Prilosec will send refills to pharmacy of patient's choice continue standard antireflux measures and avoidance of gastric irritants.  He will need to return for screening colonoscopy in 2021 for surveillance.  Follow-up in GI office sooner if needed       The following portions of the patient's history were reviewed and updated as appropriate:     Past Surgical History:   Procedure Laterality Date   • COLONOSCOPY  02/01/2011   • ENDOSCOPY      w/biopsy. Normal esophagus, symmetrical & patent pylorus, & duodenum. Hiatus hernia was found in GE junction. Non-erosive gastritis found in antrum. Biopsy taken.   • PRE-MALIGNANT / BENIGN SKIN LESION EXCISION     • SKIN BIOPSY     • UPPER GASTROINTESTINAL ENDOSCOPY  02/03/2011     Family History   Problem Relation Age of Onset   • Diabetes Other    • Hypertension Other        Prior to Admission medications    Medication Sig Start Date End Date Taking? Authorizing Provider   finasteride (PROSCAR) 5 MG tablet Take 1.25 mg by mouth.   Yes ProviderLes MD   fluocinonide (LIDEX) 0.05 % ointment  6/8/17  Yes ProviderLes MD   HYDROcodone-acetaminophen (NORCO) 7.5-325 MG per tablet  8/6/18  Yes Les Grimes MD  "  lisinopril (PRINIVIL,ZESTRIL) 10 MG tablet  6/7/17  Yes Les Grimes MD   metroNIDAZOLE (METROGEL) 0.75 % gel  6/8/17  Yes Les Grimes MD   omeprazole (priLOSEC) 40 MG capsule Take 1 capsule by mouth Daily. 5/23/18  Yes Nicolasa Hoyos APRN   gabapentin (NEURONTIN) 400 MG capsule Take 400 mg by mouth. 9/25/18   Les Grimes MD   nabumetone (RELAFEN) 750 MG tablet 1 TABLET BY MOUTH TWICE A DAY 4/5/19   Les Grimes MD   predniSONE (DELTASONE) 5 MG tablet  3/6/19   Les Grimes MD   aspirin 81 MG chewable tablet Chew 81 mg. 9/25/18 5/22/19  Les Grimes MD   ondansetron ODT (ZOFRAN-ODT) 8 MG disintegrating tablet  10/18/18 5/22/19  Les Grimes MD     No Known Allergies  Social History     Socioeconomic History   • Marital status:      Spouse name: Not on file   • Number of children: Not on file   • Years of education: Not on file   • Highest education level: Not on file   Tobacco Use   • Smoking status: Never Smoker   • Smokeless tobacco: Former User     Types: Snuff, Chew   Substance and Sexual Activity   • Alcohol use: Yes     Comment: Patient States He Comsumes Approximately 3 Alcoholic Beverages Per Month   • Drug use: No   • Sexual activity: Defer     Comment:        Review of Systems  Review of Systems   Constitutional: Negative for activity change, appetite change, chills, diaphoresis, fatigue, fever and unexpected weight change.   HENT: Negative for sore throat and trouble swallowing.    Respiratory: Negative for shortness of breath.    Gastrointestinal: Negative for abdominal distention, abdominal pain, anal bleeding, blood in stool, constipation, diarrhea, nausea, rectal pain and vomiting.   Musculoskeletal: Negative for arthralgias.   Skin: Negative for pallor.   Neurological: Negative for light-headedness.        /72 (BP Location: Left arm)   Pulse 84   Ht 170.2 cm (67\")   Wt 92.4 kg (203 lb 9.6 oz)   BMI 31.89 kg/m² "     Objective    Physical Exam   Constitutional: He is oriented to person, place, and time. He appears well-developed and well-nourished. He is cooperative. No distress.   HENT:   Head: Normocephalic and atraumatic.   Neck: Normal range of motion. Neck supple. No thyromegaly present.   Cardiovascular: Normal rate, regular rhythm and normal heart sounds.   Pulmonary/Chest: Effort normal and breath sounds normal. He has no wheezes. He has no rhonchi. He has no rales.   Abdominal: Soft. Normal appearance and bowel sounds are normal. He exhibits no distension. There is no hepatosplenomegaly. There is no tenderness. There is no rigidity and no guarding. No hernia.   Lymphadenopathy:     He has no cervical adenopathy.   Neurological: He is alert and oriented to person, place, and time.   Skin: Skin is warm, dry and intact. No rash noted. No pallor.   Psychiatric: He has a normal mood and affect. His speech is normal.     Procedure visit on 10/16/2018   Component Date Value Ref Range Status   • Case Report 10/16/2018    Final                    Value:Surgical Pathology Report                         Case: [id number]                                  Authorizing Provider:  Felix Darling MD      Collected:           10/16/2018 09:11 AM          Ordering Location:     [facility]     Received:            10/16/2018 12:57 PM                                 [facility] SURGERY                                                        Pathologist:           Wolfgang Taylor MD                                                           Specimen:    Chest Wall, Upper right                                                                   • Final Diagnosis 10/16/2018    Final                    Value:This result contains rich text formatting which cannot be displayed here.   • Gross Description 10/16/2018    Final                    Value:This result contains rich text formatting which cannot be displayed here.  "    Assessment/Plan      1. Gastroesophageal reflux disease without esophagitis    .       Orders placed during this encounter include:  No orders of the defined types were placed in this encounter.      * Surgery not found *    Review and/or summary of lab tests, radiology, procedures, medications. Review and summary of old records and obtaining of history. The risks and benefits of my recommendations, as well as other treatment options were discussed with the patient today. Questions were answered.    New Medications Ordered This Visit   Medications   • omeprazole (priLOSEC) 40 MG capsule     Sig: Take 1 capsule by mouth Daily.     Dispense:  90 capsule     Refill:  3       Follow-up: Return in about 1 year (around 5/22/2020).          This document has been electronically signed by GIOVANNA Johnston on May 22, 2019 3:27 PM             Results for orders placed or performed in visit on 10/16/18   Tissue Pathology Exam   Result Value Ref Range    Case Report       Surgical Pathology Report                         Case: [id number]                                  Authorizing Provider:  Felix Darling MD      Collected:           10/16/2018 09:11 AM          Ordering Location:     [facility]     Received:            10/16/2018 12:57 PM                                 GROUP GENERAL SURGERY                                                        Pathologist:           Wolfgang Taylor MD                                                           Specimen:    Chest Wall, Upper right                                                                    Final Diagnosis       SKIN, RIGHT CHEST WALL:  HEALING BIOPSY SITE.  NEGATIVE FOR RESIDUAL SQUAMOUS CELLS CARCINOMA.  ACTINIC KERATOSIS.      Gross Description       The specimen is labeled \"SCC, upper right chest wall\".  A fusiform skin fragment of skin and subcutis measures 3.3 x 1.8 cm in surface area and 0.6 cm in diameter.  A depressed white scar measures " 1.1 x 1.7 cm and extends to within 0.1 cm of the nearest margin of excision.  The specimen is serially sectioned and entirely submitted in 2 blocks after the margins are marked with ink.       Results for orders placed or performed in visit on 09/21/17   CBC Auto Differential   Result Value Ref Range    WBC 8.48 3.20 - 9.80 10*3/mm3    RBC 5.14 4.37 - 5.74 10*6/mm3    Hemoglobin 14.7 13.7 - 17.3 g/dL    Hematocrit 44.0 39.0 - 49.0 %    MCV 85.6 80.0 - 98.0 fL    MCH 28.6 26.5 - 34.0 pg    MCHC 33.4 31.5 - 36.3 g/dL    RDW 13.1 11.5 - 14.5 %    RDW-SD 41.1 35.1 - 43.9 fl    MPV 11.7 8.0 - 12.0 fL    Platelets 191 150 - 450 10*3/mm3    Neutrophil % 54.9 37.0 - 80.0 %    Lymphocyte % 28.7 10.0 - 50.0 %    Monocyte % 13.6 (H) 0.0 - 12.0 %    Eosinophil % 2.0 0.0 - 7.0 %    Basophil % 0.4 0.0 - 2.0 %    Immature Grans % 0.4 0.0 - 0.5 %    Neutrophils, Absolute 4.67 2.00 - 8.60 10*3/mm3    Lymphocytes, Absolute 2.43 0.60 - 4.20 10*3/mm3    Monocytes, Absolute 1.15 (H) 0.00 - 0.90 10*3/mm3    Eosinophils, Absolute 0.17 0.00 - 0.70 10*3/mm3    Basophils, Absolute 0.03 0.00 - 0.20 10*3/mm3    Immature Grans, Absolute 0.03 (H) 0.00 - 0.02 10*3/mm3   TSH   Result Value Ref Range    TSH 3.120 0.460 - 4.680 mIU/mL   Comprehensive Metabolic Panel   Result Value Ref Range    Glucose 121 (H) 60 - 100 mg/dL    BUN 14 7 - 21 mg/dL    Creatinine 0.95 0.70 - 1.30 mg/dL    Sodium 138 137 - 145 mmol/L    Potassium 4.1 3.5 - 5.1 mmol/L    Chloride 100 95 - 110 mmol/L    CO2 26.0 22.0 - 31.0 mmol/L    Calcium 9.1 8.4 - 10.2 mg/dL    Total Protein 7.1 6.3 - 8.6 g/dL    Albumin 4.20 3.40 - 4.80 g/dL    ALT (SGPT) 56 21 - 72 U/L    AST (SGOT) 33 17 - 59 U/L    Alkaline Phosphatase 59 38 - 126 U/L    Total Bilirubin 0.7 0.2 - 1.3 mg/dL    eGFR Non  Amer 80 49 - 113 mL/min/1.73    Globulin 2.9 2.3 - 3.5 gm/dL    A/G Ratio 1.4 1.1 - 1.8 g/dL    BUN/Creatinine Ratio 14.7 7.0 - 25.0    Anion Gap 12.0 5.0 - 15.0 mmol/L   Results for orders  placed or performed during the hospital encounter of 06/09/16   CBC and Differential   Result Value Ref Range    WBC 6.5 3.2 - 9.8 x1000/uL    RBC 4.87 4.37 - 5.74 gaurav/mm3    Hemoglobin 14.3 13.7 - 17.3 gm/dl    Hematocrit 41.5 39.0 - 49.0 %    MCV 85.2 80.0 - 98.0 fl    MCH 29.4 26.0 - 34.0 pg    MCHC 34.5 31.5 - 36.3 gm/dl    RDW 13.6 11.5 - 14.5 %    Platelets 191 150 - 450 x1000/mm3    MPV 11.7 8.0 - 12.0 fl    Neutrophil Rel % 51.1 37.0 - 80.0 %    Lymphocyte Rel % 31.7 10.0 - 50.0 %    Monocyte Rel % 14.2 (H) 0.0 - 12.0 %    Eosinophil Rel % 2.0 0.0 - 7.0 %    Basophil Rel % 0.5 0.0 - 2.0 %    Immature Granulocyte Rel % 0.50 0.00 - 0.50 %    Neutrophils Absolute 3.33 2.00 - 8.60 x1000/uL    Lymphocytes Absolute 2.06 0.60 - 4.20 x1000/uL    Monocytes Absolute 0.92 (H) 0.00 - 0.90 x1000/uL    Eosinophils Absolute 0.13 0.00 - 0.70 x1000/uL    Basophils Absolute 0.03 0.00 - 0.20 x1000/uL    Immature Granulocytes Absolute 0.030 (H) 0.005 - 0.022 x1000/uL   Comprehensive metabolic panel   Result Value Ref Range    Sodium 139 137 - 145 mmol/L    Potassium 4.2 3.5 - 5.1 mmol/L    Chloride 101 95 - 110 mmol/L    CO2 25 22 - 31 mmol/L    Anion Gap 13.0 5.0 - 15.0 mmol/L    Glucose 102 (H) 60 - 100 mg/dl    BUN 12 7 - 21 mg/dl    Creatinine 0.8 0.7 - 1.3 mg/dl    GFR MDRD Non  98 49 - 113 mL/min/1.73 sq.M    GFR MDRD  118 (H) 49 - 113 mL/min/1.73 sq.M    Calcium 9.2 8.4 - 10.2 mg/dl    Total Protein 6.8 6.3 - 8.6 gm/dl    Albumin 4.1 3.4 - 4.8 gm/dl    Total Bilirubin 0.8 0.2 - 1.3 mg/dl    Alkaline Phosphatase 54 38 - 126 U/L    AST (SGOT) 34 17 - 59 U/L    ALT (SGPT) 42 21 - 72 U/L   Results for orders placed or performed during the hospital encounter of 12/22/15   Nuclear antigen antibody, IFA   Result Value Ref Range    WINNIE Negative    CBC and Differential   Result Value Ref Range    WBC 8.1 3.2 - 9.8 x1000/uL    RBC 5.15 4.37 - 5.74 gaurav/mm3    Hemoglobin 14.5 13.7 - 17.3 gm/dl     Hematocrit 42.5 39.0 - 49.0 %    MCV 82.5 80.0 - 98.0 fl    MCH 28.2 26.0 - 34.0 pg    MCHC 34.1 31.5 - 36.3 gm/dl    RDW 13.2 11.5 - 14.5 %    Platelets 213 150 - 450 x1000/mm3    MPV 10.7 8.0 - 12.0 fl    Neutrophil Rel % 55.3 37.0 - 80.0 %    Lymphocyte Rel % 28.4 10.0 - 50.0 %    Monocyte Rel % 14.0 (H) 0.0 - 12.0 %    Eosinophil Rel % 1.7 0.0 - 7.0 %    Basophil Rel % 0.2 0.0 - 2.0 %    Immature Granulocyte Rel % 0.40 0.00 - 0.50 %    Neutrophils Absolute 4.46 2.00 - 8.60 x1000/uL    Lymphocytes Absolute 2.29 0.60 - 4.20 x1000/uL    Monocytes Absolute 1.13 (H) 0.00 - 0.90 x1000/uL    Eosinophils Absolute 0.14 0.00 - 0.70 x1000/uL    Basophils Absolute 0.02 0.00 - 0.20 x1000/uL    Immature Granulocytes Absolute 0.030 (H) 0.005 - 0.022 x1000/uL     *Note: Due to a large number of results and/or encounters for the requested time period, some results have not been displayed. A complete set of results can be found in Results Review.

## 2020-02-11 DIAGNOSIS — K21.9 GASTROESOPHAGEAL REFLUX DISEASE WITHOUT ESOPHAGITIS: ICD-10-CM

## 2020-02-11 RX ORDER — OMEPRAZOLE 40 MG/1
40 CAPSULE, DELAYED RELEASE ORAL DAILY
Qty: 90 CAPSULE | Refills: 3 | Status: SHIPPED | OUTPATIENT
Start: 2020-02-11 | End: 2020-05-26 | Stop reason: SDUPTHER

## 2020-02-14 DIAGNOSIS — K21.9 GASTROESOPHAGEAL REFLUX DISEASE WITHOUT ESOPHAGITIS: ICD-10-CM

## 2020-02-14 RX ORDER — OMEPRAZOLE 40 MG/1
40 CAPSULE, DELAYED RELEASE ORAL DAILY
Qty: 90 CAPSULE | Refills: 3 | Status: CANCELLED | OUTPATIENT
Start: 2020-02-14

## 2020-05-26 ENCOUNTER — OFFICE VISIT (OUTPATIENT)
Dept: GASTROENTEROLOGY | Facility: CLINIC | Age: 70
End: 2020-05-26

## 2020-05-26 VITALS
HEIGHT: 67 IN | DIASTOLIC BLOOD PRESSURE: 82 MMHG | WEIGHT: 210 LBS | SYSTOLIC BLOOD PRESSURE: 133 MMHG | BODY MASS INDEX: 32.96 KG/M2

## 2020-05-26 DIAGNOSIS — K59.04 CHRONIC IDIOPATHIC CONSTIPATION: Primary | ICD-10-CM

## 2020-05-26 DIAGNOSIS — K21.9 GASTROESOPHAGEAL REFLUX DISEASE WITHOUT ESOPHAGITIS: ICD-10-CM

## 2020-05-26 PROCEDURE — 99442 PR PHYS/QHP TELEPHONE EVALUATION 11-20 MIN: CPT | Performed by: NURSE PRACTITIONER

## 2020-05-26 RX ORDER — OMEPRAZOLE 40 MG/1
40 CAPSULE, DELAYED RELEASE ORAL DAILY
Qty: 90 CAPSULE | Refills: 3 | Status: SHIPPED | OUTPATIENT
Start: 2020-05-26

## 2020-05-26 RX ORDER — DOCUSATE SODIUM 100 MG/1
100 CAPSULE, LIQUID FILLED ORAL 2 TIMES DAILY PRN
Qty: 60 CAPSULE | Refills: 3 | Status: SHIPPED | OUTPATIENT
Start: 2020-05-26

## 2020-05-26 NOTE — PROGRESS NOTES
Chief Complaint   Patient presents with   • GERD without esophagitis       Subjective    Enrique Selby is a 69 y.o. male. he is here today for follow-up.  69-year-old male presents for one-year follow-up regarding GERD with esophagitis.  Reports he has been doing very well with omeprazole daily denies any abdominal pain, nausea vomiting or diarrhea.  Denies any changes in his bowel habits or blood within his stool.  He reports he has had some issues with constipation he is on chronic pain medication he is concerned as he has hip replacement scheduled on Monday and he had issues with constipation after procedure last time.  States bowel movements are hard and does not go as often as he would like.    Heartburn   He complains of heartburn. He reports no abdominal pain, no belching, no chest pain, no choking, no coughing, no dysphagia, no early satiety, no globus sensation, no hoarse voice, no nausea or no sore throat. Pertinent negatives include no fatigue.            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, LAMONT,LLL      • Disorder of lipoprotein and lipid metabolism    • Epigastric pain    • Gastroesophageal reflux disease    • Gastroesophageal reflux disease    • Gastrointestinal symptom     Recent change in bowel habits      • Hematuria, unspecified    • Hiatal hernia    • Hip pain    • Hypertriglyceridemia    • IBS (irritable bowel syndrome)    • Malignant melanoma (CMS/HCC)    • Multiple joint pain     Labs ordered, consult       • Nausea    • Right lower quadrant pain    • Right upper quadrant pain    • Screening for hyperlipidemia    • Screening for malignant neoplasm of prostate    • Trochanteric bursitis     Inj of Solumedrol with 1% Xylocaine given,tolerate   • UTI (urinary tract infection)      Past Surgical History:    Procedure Laterality Date   • COLONOSCOPY  02/01/2011   • ENDOSCOPY      w/biopsy. Normal esophagus, symmetrical & patent pylorus, & duodenum. Hiatus hernia was found in GE junction. Non-erosive gastritis found in antrum. Biopsy taken.   • PRE-MALIGNANT / BENIGN SKIN LESION EXCISION     • SKIN BIOPSY     • UPPER GASTROINTESTINAL ENDOSCOPY  02/03/2011     Family History   Problem Relation Age of Onset   • Diabetes Other    • Hypertension Other        Prior to Admission medications    Medication Sig Start Date End Date Taking? Authorizing Provider   finasteride (PROSCAR) 5 MG tablet Take 1.25 mg by mouth.    Les Grimes MD   fluocinonide (LIDEX) 0.05 % ointment  6/8/17   Les Grimes MD   gabapentin (NEURONTIN) 400 MG capsule Take 400 mg by mouth. 9/25/18   Les Grimes MD   HYDROcodone-acetaminophen (NORCO) 7.5-325 MG per tablet  8/6/18   Les Grimes MD   lisinopril (PRINIVIL,ZESTRIL) 10 MG tablet  6/7/17   Les Grimes MD   metroNIDAZOLE (METROGEL) 0.75 % gel  6/8/17   Les Grimes MD   nabumetone (RELAFEN) 750 MG tablet 1 TABLET BY MOUTH TWICE A DAY 4/5/19   Les Grimes MD   omeprazole (priLOSEC) 40 MG capsule Take 1 capsule by mouth Daily. 2/11/20   Nicolasa Hoyos APRN   predniSONE (DELTASONE) 5 MG tablet  3/6/19   Les Grimes MD     No Known Allergies  Social History     Socioeconomic History   • Marital status:      Spouse name: Not on file   • Number of children: Not on file   • Years of education: Not on file   • Highest education level: Not on file   Tobacco Use   • Smoking status: Never Smoker   • Smokeless tobacco: Former User     Types: Snuff, Chew   Substance and Sexual Activity   • Alcohol use: Yes     Comment: Patient States He Comsumes Approximately 3 Alcoholic Beverages Per Month   • Drug use: No   • Sexual activity: Defer     Comment:        Review of Systems  Review of Systems   Constitutional: Negative for  "activity change, appetite change, chills, diaphoresis, fatigue, fever and unexpected weight change.   HENT: Negative for hoarse voice, sore throat and trouble swallowing.    Respiratory: Negative for cough, choking and shortness of breath.    Cardiovascular: Negative for chest pain.   Gastrointestinal: Positive for constipation (chronic pain medications) and heartburn. Negative for abdominal distention, abdominal pain, anal bleeding, blood in stool, diarrhea, dysphagia, nausea, rectal pain and vomiting.   Musculoskeletal: Negative for arthralgias.   Skin: Negative for pallor.   Neurological: Negative for light-headedness.        /82   Ht 170.2 cm (67\")   Wt 95.3 kg (210 lb)   BMI 32.89 kg/m²     Objective    Physical Exam   Psychiatric: He has a normal mood and affect. His speech is normal.     Procedure visit on 10/16/2018   Component Date Value Ref Range Status   • Case Report 10/16/2018    Final                    Value:Surgical Pathology Report                         Case: [id number]                                  Authorizing Provider:  Felix Darling MD      Collected:           10/16/2018 09:11 AM          Ordering Location:     [facility]     Received:            10/16/2018 12:57 PM                                 [facility]                                                        Pathologist:           Wolfgang Taylor MD                                                           Specimen:    Chest Wall, Upper right                                                                   • Final Diagnosis 10/16/2018    Final                    Value:This result contains rich text formatting which cannot be displayed here.   • Gross Description 10/16/2018    Final                    Value:This result contains rich text formatting which cannot be displayed here.     Assessment/Plan      1. Chronic idiopathic constipation    2. Gastroesophageal reflux disease without esophagitis    . "   Will add Colace twice daily as needed for constipation.  Discussed use of MiraLAX if constipation becomes more severe contact office if these do not work and will schedule follow-up and discuss other medications if needed.  Continue omeprazole daily standard antireflux measures and avoidance of gastric irritants.  Discussed with patient he will be due for screening colonoscopy February 2021 he is going to discuss Cologuard with primary care provider as he has never had colon polyps no family history of colon cancer and denies any melena or hematochezia he could consider Cologuard    Orders placed during this encounter include:  No orders of the defined types were placed in this encounter.  This visit has been rescheduled as a phone visit to comply with patient safety concerns in accordance with CDC recommendations. Total time of discussion was 13 minutes.      * Surgery not found *    Review and/or summary of lab tests, radiology, procedures, medications. Review and summary of old records and obtaining of history. The risks and benefits of my recommendations, as well as other treatment options were discussed with the patient today. Questions were answered.    New Medications Ordered This Visit   Medications   • omeprazole (priLOSEC) 40 MG capsule     Sig: Take 1 capsule by mouth Daily.     Dispense:  90 capsule     Refill:  3   • docusate sodium (Colace) 100 MG capsule     Sig: Take 1 capsule by mouth 2 (Two) Times a Day As Needed for Constipation.     Dispense:  60 capsule     Refill:  3       Follow-up: Return in about 9 months (around 2/26/2021), or Colon due 2/1/11.          This document has been electronically signed by GIOVANNA Johnston on May 26, 2020 13:43             Results for orders placed or performed in visit on 10/16/18   Tissue Pathology Exam   Result Value Ref Range    Case Report       Surgical Pathology Report                         Case: [id number]                                 "  Authorizing Provider:  Felix Darling MD      Collected:           10/16/2018 09:11 AM          Ordering Location:     [facility]     Received:            10/16/2018 12:57 PM                                 [facility]                                                        Pathologist:           Wolfgang Taylor MD                                                           Specimen:    Chest Wall, Upper right                                                                    Final Diagnosis       SKIN, RIGHT CHEST WALL:  HEALING BIOPSY SITE.  NEGATIVE FOR RESIDUAL SQUAMOUS CELLS CARCINOMA.  ACTINIC KERATOSIS.      Gross Description       The specimen is labeled \"SCC, upper right chest wall\".  A fusiform skin fragment of skin and subcutis measures 3.3 x 1.8 cm in surface area and 0.6 cm in diameter.  A depressed white scar measures 1.1 x 1.7 cm and extends to within 0.1 cm of the nearest margin of excision.  The specimen is serially sectioned and entirely submitted in 2 blocks after the margins are marked with ink.       Results for orders placed or performed in visit on 09/21/17   CBC Auto Differential   Result Value Ref Range    WBC 8.48 3.20 - 9.80 10*3/mm3    RBC 5.14 4.37 - 5.74 10*6/mm3    Hemoglobin 14.7 13.7 - 17.3 g/dL    Hematocrit 44.0 39.0 - 49.0 %    MCV 85.6 80.0 - 98.0 fL    MCH 28.6 26.5 - 34.0 pg    MCHC 33.4 31.5 - 36.3 g/dL    RDW 13.1 11.5 - 14.5 %    RDW-SD 41.1 35.1 - 43.9 fl    MPV 11.7 8.0 - 12.0 fL    Platelets 191 150 - 450 10*3/mm3    Neutrophil % 54.9 37.0 - 80.0 %    Lymphocyte % 28.7 10.0 - 50.0 %    Monocyte % 13.6 (H) 0.0 - 12.0 %    Eosinophil % 2.0 0.0 - 7.0 %    Basophil % 0.4 0.0 - 2.0 %    Immature Grans % 0.4 0.0 - 0.5 %    Neutrophils, Absolute 4.67 2.00 - 8.60 10*3/mm3    Lymphocytes, Absolute 2.43 0.60 - 4.20 10*3/mm3    Monocytes, Absolute 1.15 (H) 0.00 - 0.90 10*3/mm3    Eosinophils, Absolute 0.17 0.00 - 0.70 10*3/mm3    Basophils, Absolute 0.03 " 0.00 - 0.20 10*3/mm3    Immature Grans, Absolute 0.03 (H) 0.00 - 0.02 10*3/mm3   TSH   Result Value Ref Range    TSH 3.120 0.460 - 4.680 mIU/mL   Comprehensive Metabolic Panel   Result Value Ref Range    Glucose 121 (H) 60 - 100 mg/dL    BUN 14 7 - 21 mg/dL    Creatinine 0.95 0.70 - 1.30 mg/dL    Sodium 138 137 - 145 mmol/L    Potassium 4.1 3.5 - 5.1 mmol/L    Chloride 100 95 - 110 mmol/L    CO2 26.0 22.0 - 31.0 mmol/L    Calcium 9.1 8.4 - 10.2 mg/dL    Total Protein 7.1 6.3 - 8.6 g/dL    Albumin 4.20 3.40 - 4.80 g/dL    ALT (SGPT) 56 21 - 72 U/L    AST (SGOT) 33 17 - 59 U/L    Alkaline Phosphatase 59 38 - 126 U/L    Total Bilirubin 0.7 0.2 - 1.3 mg/dL    eGFR Non  Amer 80 49 - 113 mL/min/1.73    Globulin 2.9 2.3 - 3.5 gm/dL    A/G Ratio 1.4 1.1 - 1.8 g/dL    BUN/Creatinine Ratio 14.7 7.0 - 25.0    Anion Gap 12.0 5.0 - 15.0 mmol/L   Results for orders placed or performed during the hospital encounter of 06/09/16   CBC and Differential   Result Value Ref Range    WBC 6.5 3.2 - 9.8 x1000/uL    RBC 4.87 4.37 - 5.74 gaurav/mm3    Hemoglobin 14.3 13.7 - 17.3 gm/dl    Hematocrit 41.5 39.0 - 49.0 %    MCV 85.2 80.0 - 98.0 fl    MCH 29.4 26.0 - 34.0 pg    MCHC 34.5 31.5 - 36.3 gm/dl    RDW 13.6 11.5 - 14.5 %    Platelets 191 150 - 450 x1000/mm3    MPV 11.7 8.0 - 12.0 fl    Neutrophil Rel % 51.1 37.0 - 80.0 %    Lymphocyte Rel % 31.7 10.0 - 50.0 %    Monocyte Rel % 14.2 (H) 0.0 - 12.0 %    Eosinophil Rel % 2.0 0.0 - 7.0 %    Basophil Rel % 0.5 0.0 - 2.0 %    Immature Granulocyte Rel % 0.50 0.00 - 0.50 %    Neutrophils Absolute 3.33 2.00 - 8.60 x1000/uL    Lymphocytes Absolute 2.06 0.60 - 4.20 x1000/uL    Monocytes Absolute 0.92 (H) 0.00 - 0.90 x1000/uL    Eosinophils Absolute 0.13 0.00 - 0.70 x1000/uL    Basophils Absolute 0.03 0.00 - 0.20 x1000/uL    Immature Granulocytes Absolute 0.030 (H) 0.005 - 0.022 x1000/uL   Comprehensive metabolic panel   Result Value Ref Range    Sodium 139 137 - 145 mmol/L    Potassium 4.2  3.5 - 5.1 mmol/L    Chloride 101 95 - 110 mmol/L    CO2 25 22 - 31 mmol/L    Anion Gap 13.0 5.0 - 15.0 mmol/L    Glucose 102 (H) 60 - 100 mg/dl    BUN 12 7 - 21 mg/dl    Creatinine 0.8 0.7 - 1.3 mg/dl    GFR MDRD Non  98 49 - 113 mL/min/1.73 sq.M    GFR MDRD  118 (H) 49 - 113 mL/min/1.73 sq.M    Calcium 9.2 8.4 - 10.2 mg/dl    Total Protein 6.8 6.3 - 8.6 gm/dl    Albumin 4.1 3.4 - 4.8 gm/dl    Total Bilirubin 0.8 0.2 - 1.3 mg/dl    Alkaline Phosphatase 54 38 - 126 U/L    AST (SGOT) 34 17 - 59 U/L    ALT (SGPT) 42 21 - 72 U/L   Results for orders placed or performed during the hospital encounter of 12/22/15   Nuclear antigen antibody, IFA   Result Value Ref Range    WINNIE Negative    CBC and Differential   Result Value Ref Range    WBC 8.1 3.2 - 9.8 x1000/uL    RBC 5.15 4.37 - 5.74 gaurav/mm3    Hemoglobin 14.5 13.7 - 17.3 gm/dl    Hematocrit 42.5 39.0 - 49.0 %    MCV 82.5 80.0 - 98.0 fl    MCH 28.2 26.0 - 34.0 pg    MCHC 34.1 31.5 - 36.3 gm/dl    RDW 13.2 11.5 - 14.5 %    Platelets 213 150 - 450 x1000/mm3    MPV 10.7 8.0 - 12.0 fl    Neutrophil Rel % 55.3 37.0 - 80.0 %    Lymphocyte Rel % 28.4 10.0 - 50.0 %    Monocyte Rel % 14.0 (H) 0.0 - 12.0 %    Eosinophil Rel % 1.7 0.0 - 7.0 %    Basophil Rel % 0.2 0.0 - 2.0 %    Immature Granulocyte Rel % 0.40 0.00 - 0.50 %    Neutrophils Absolute 4.46 2.00 - 8.60 x1000/uL    Lymphocytes Absolute 2.29 0.60 - 4.20 x1000/uL    Monocytes Absolute 1.13 (H) 0.00 - 0.90 x1000/uL    Eosinophils Absolute 0.14 0.00 - 0.70 x1000/uL    Basophils Absolute 0.02 0.00 - 0.20 x1000/uL    Immature Granulocytes Absolute 0.030 (H) 0.005 - 0.022 x1000/uL     *Note: Due to a large number of results and/or encounters for the requested time period, some results have not been displayed. A complete set of results can be found in Results Review.

## 2020-05-26 NOTE — PATIENT INSTRUCTIONS
